# Patient Record
Sex: MALE | Race: WHITE
[De-identification: names, ages, dates, MRNs, and addresses within clinical notes are randomized per-mention and may not be internally consistent; named-entity substitution may affect disease eponyms.]

---

## 2019-03-08 ENCOUNTER — HOSPITAL ENCOUNTER (OUTPATIENT)
Dept: HOSPITAL 19 - COL.PUL | Age: 24
End: 2019-03-08
Attending: INTERNAL MEDICINE
Payer: OTHER GOVERNMENT

## 2019-03-08 DIAGNOSIS — R06.02: Primary | ICD-10-CM

## 2019-03-08 DIAGNOSIS — Z87.891: ICD-10-CM

## 2022-11-16 ENCOUNTER — HOSPITAL ENCOUNTER (OUTPATIENT)
Age: 27
Discharge: HOME | End: 2022-11-16
Payer: OTHER MISCELLANEOUS

## 2022-11-16 VITALS
SYSTOLIC BLOOD PRESSURE: 129 MMHG | OXYGEN SATURATION: 100 % | BODY MASS INDEX: 19.88 KG/M2 | DIASTOLIC BLOOD PRESSURE: 69 MMHG | RESPIRATION RATE: 18 BRPM | WEIGHT: 150 LBS | HEIGHT: 73 IN | TEMPERATURE: 99 F | HEART RATE: 75 BPM

## 2022-11-16 DIAGNOSIS — S61.011A LACERATION OF RIGHT THUMB WITHOUT FOREIGN BODY WITHOUT DAMAGE TO NAIL, INITIAL ENCOUNTER: Primary | ICD-10-CM

## 2022-11-16 PROCEDURE — 99203 OFFICE O/P NEW LOW 30 MIN: CPT | Performed by: NURSE PRACTITIONER

## 2022-11-16 PROCEDURE — 90715 TDAP VACCINE 7 YRS/> IM: CPT | Performed by: NURSE PRACTITIONER

## 2022-11-16 PROCEDURE — 90471 IMMUNIZATION ADMIN: CPT | Performed by: NURSE PRACTITIONER

## 2022-11-16 RX ORDER — ALBUTEROL SULFATE 90 UG/1
AEROSOL, METERED RESPIRATORY (INHALATION) EVERY 6 HOURS PRN
COMMUNITY

## 2023-10-23 ENCOUNTER — HOSPITAL ENCOUNTER (OUTPATIENT)
Age: 28
Discharge: HOME | End: 2023-10-23

## 2023-10-23 VITALS
TEMPERATURE: 98 F | RESPIRATION RATE: 18 BRPM | HEIGHT: 73 IN | SYSTOLIC BLOOD PRESSURE: 120 MMHG | WEIGHT: 140 LBS | OXYGEN SATURATION: 100 % | HEART RATE: 65 BPM | BODY MASS INDEX: 18.55 KG/M2 | DIASTOLIC BLOOD PRESSURE: 57 MMHG

## 2023-10-23 DIAGNOSIS — R11.10 VOMITING: Primary | ICD-10-CM

## 2023-10-23 LAB
POCT INFLUENZA A: NEGATIVE
POCT INFLUENZA B: NEGATIVE
SARS-COV-2 RNA RESP QL NAA+PROBE: NOT DETECTED

## 2023-10-23 PROCEDURE — 87502 INFLUENZA DNA AMP PROBE: CPT | Performed by: NURSE PRACTITIONER

## 2023-10-23 PROCEDURE — U0002 COVID-19 LAB TEST NON-CDC: HCPCS | Performed by: NURSE PRACTITIONER

## 2023-10-23 PROCEDURE — 99213 OFFICE O/P EST LOW 20 MIN: CPT | Performed by: NURSE PRACTITIONER

## 2023-10-23 RX ORDER — ONDANSETRON 4 MG/1
4 TABLET, ORALLY DISINTEGRATING ORAL EVERY 4 HOURS PRN
Qty: 10 TABLET | Refills: 0 | Status: SHIPPED | OUTPATIENT
Start: 2023-10-23 | End: 2023-10-30

## 2023-10-23 NOTE — DISCHARGE INSTRUCTIONS
Take the Zofran as needed for nausea/vomiting. Clear liquid diet for 24 hours. Thereafter bland diet until resolution of symptoms. Drink plenty fluids, especially water. Go to the ER for new or worsening symptoms.

## (undated) NOTE — LETTER
Date & Time: 10/23/2023, 2:23 PM  Patient: [facility]  Encounter Provider(s):    JAZMIN Richard       To Whom It May Concern:    Sekou Neville was seen and treated in our department on 10/23/2023. He should not return to work until October 24 .     If you have any questions or concerns, please do not hesitate to call.        _____________________________  Physician/[facility] Signature